# Patient Record
Sex: MALE | Race: WHITE
[De-identification: names, ages, dates, MRNs, and addresses within clinical notes are randomized per-mention and may not be internally consistent; named-entity substitution may affect disease eponyms.]

---

## 2019-07-23 ENCOUNTER — HOSPITAL ENCOUNTER (INPATIENT)
Dept: HOSPITAL 25 - ED | Age: 79
LOS: 2 days | Discharge: ANOTHER HOSPITAL | DRG: 282 | End: 2019-07-25
Attending: HOSPITALIST | Admitting: INTERNAL MEDICINE
Payer: MEDICARE

## 2019-07-23 DIAGNOSIS — I10: ICD-10-CM

## 2019-07-23 DIAGNOSIS — K21.9: ICD-10-CM

## 2019-07-23 DIAGNOSIS — Z87.891: ICD-10-CM

## 2019-07-23 DIAGNOSIS — E78.5: ICD-10-CM

## 2019-07-23 DIAGNOSIS — Z82.49: ICD-10-CM

## 2019-07-23 DIAGNOSIS — Z85.038: ICD-10-CM

## 2019-07-23 DIAGNOSIS — Z80.9: ICD-10-CM

## 2019-07-23 DIAGNOSIS — I21.4: Primary | ICD-10-CM

## 2019-07-23 DIAGNOSIS — Z79.84: ICD-10-CM

## 2019-07-23 DIAGNOSIS — E11.9: ICD-10-CM

## 2019-07-23 DIAGNOSIS — J45.909: ICD-10-CM

## 2019-07-23 DIAGNOSIS — Z79.51: ICD-10-CM

## 2019-07-23 DIAGNOSIS — Z79.899: ICD-10-CM

## 2019-07-23 DIAGNOSIS — I25.110: ICD-10-CM

## 2019-07-23 LAB
ALBUMIN SERPL BCG-MCNC: 4.2 G/DL (ref 3.2–5.2)
ALBUMIN/GLOB SERPL: 1.5 {RATIO} (ref 1–3)
ALP SERPL-CCNC: 100 U/L (ref 34–104)
ALT SERPL W P-5'-P-CCNC: 28 U/L (ref 7–52)
ANION GAP SERPL CALC-SCNC: 7 MMOL/L (ref 2–11)
APTT PPP: 34.5 SECONDS (ref 26–38)
AST SERPL-CCNC: 20 U/L (ref 13–39)
BASOPHILS # BLD AUTO: 0.1 10^3/UL (ref 0–0.2)
BUN SERPL-MCNC: 20 MG/DL (ref 6–24)
BUN/CREAT SERPL: 19.2 (ref 8–20)
CALCIUM SERPL-MCNC: 9.8 MG/DL (ref 8.6–10.3)
CHLORIDE SERPL-SCNC: 106 MMOL/L (ref 101–111)
EOSINOPHIL # BLD AUTO: 0 10^3/UL (ref 0–0.6)
GLOBULIN SER CALC-MCNC: 2.8 G/DL (ref 2–4)
GLUCOSE SERPL-MCNC: 199 MG/DL (ref 70–100)
HCO3 SERPL-SCNC: 26 MMOL/L (ref 22–32)
HCT VFR BLD AUTO: 44 % (ref 42–52)
HGB BLD-MCNC: 14.8 G/DL (ref 14–18)
INR PPP/BLD: 1.04 (ref 0.82–1.09)
LYMPHOCYTES # BLD AUTO: 0.8 10^3/UL (ref 1–4.8)
MCH RBC QN AUTO: 29 PG (ref 27–31)
MCHC RBC AUTO-ENTMCNC: 33 G/DL (ref 31–36)
MCV RBC AUTO: 86 FL (ref 80–94)
MONOCYTES # BLD AUTO: 0.3 10^3/UL (ref 0–0.8)
NEUTROPHILS # BLD AUTO: 9.4 10^3/UL (ref 1.5–7.7)
NRBC # BLD AUTO: 0 10^3/UL
NRBC BLD QL AUTO: 0
PLATELET # BLD AUTO: 283 10^3/UL (ref 150–450)
POTASSIUM SERPL-SCNC: 4 MMOL/L (ref 3.5–5)
PROT SERPL-MCNC: 7 G/DL (ref 6.4–8.9)
RBC # BLD AUTO: 5.16 10^6 /UL (ref 4.18–5.48)
SODIUM SERPL-SCNC: 139 MMOL/L (ref 135–145)
TROPONIN I SERPL-MCNC: 0.22 NG/ML (ref ?–0.04)
WBC # BLD AUTO: 10.6 10^3/UL (ref 3.5–10.8)

## 2019-07-23 PROCEDURE — 36415 COLL VENOUS BLD VENIPUNCTURE: CPT

## 2019-07-23 PROCEDURE — 93306 TTE W/DOPPLER COMPLETE: CPT

## 2019-07-23 PROCEDURE — 85025 COMPLETE CBC W/AUTO DIFF WBC: CPT

## 2019-07-23 PROCEDURE — 85610 PROTHROMBIN TIME: CPT

## 2019-07-23 PROCEDURE — 93005 ELECTROCARDIOGRAM TRACING: CPT

## 2019-07-23 PROCEDURE — C1769 GUIDE WIRE: HCPCS

## 2019-07-23 PROCEDURE — 99284 EMERGENCY DEPT VISIT MOD MDM: CPT

## 2019-07-23 PROCEDURE — 85730 THROMBOPLASTIN TIME PARTIAL: CPT

## 2019-07-23 PROCEDURE — 84484 ASSAY OF TROPONIN QUANT: CPT

## 2019-07-23 PROCEDURE — C8929 TTE W OR WO FOL WCON,DOPPLER: HCPCS

## 2019-07-23 PROCEDURE — 80048 BASIC METABOLIC PNL TOTAL CA: CPT

## 2019-07-23 PROCEDURE — 80061 LIPID PANEL: CPT

## 2019-07-23 PROCEDURE — 86901 BLOOD TYPING SEROLOGIC RH(D): CPT

## 2019-07-23 PROCEDURE — 80053 COMPREHEN METABOLIC PANEL: CPT

## 2019-07-23 PROCEDURE — 86850 RBC ANTIBODY SCREEN: CPT

## 2019-07-23 PROCEDURE — 93454 CORONARY ARTERY ANGIO S&I: CPT

## 2019-07-23 PROCEDURE — 71045 X-RAY EXAM CHEST 1 VIEW: CPT

## 2019-07-23 PROCEDURE — 86900 BLOOD TYPING SEROLOGIC ABO: CPT

## 2019-07-24 LAB
CHOLEST SERPL-MCNC: 176 MG/DL
HDLC SERPL-MCNC: 44 MG/DL
TRIGL SERPL-MCNC: 94 MG/DL
TROPONIN I SERPL-MCNC: 1.06 NG/ML (ref ?–0.04)
TROPONIN I SERPL-MCNC: 1.26 NG/ML (ref ?–0.04)
TROPONIN I SERPL-MCNC: 2.52 NG/ML (ref ?–0.04)
TROPONIN I SERPL-MCNC: 2.65 NG/ML (ref ?–0.04)

## 2019-07-24 PROCEDURE — B211YZZ FLUOROSCOPY OF MULTIPLE CORONARY ARTERIES USING OTHER CONTRAST: ICD-10-PCS | Performed by: INTERNAL MEDICINE

## 2019-07-24 PROCEDURE — 4A023N7 MEASUREMENT OF CARDIAC SAMPLING AND PRESSURE, LEFT HEART, PERCUTANEOUS APPROACH: ICD-10-PCS | Performed by: INTERNAL MEDICINE

## 2019-07-24 RX ADMIN — ACETAMINOPHEN PRN MG: 325 TABLET ORAL at 21:16

## 2019-07-24 RX ADMIN — FAMOTIDINE SCH MG: 10 INJECTION, SOLUTION INTRAVENOUS at 21:17

## 2019-07-24 RX ADMIN — INSULIN LISPRO SCH: 100 INJECTION, SOLUTION INTRAVENOUS; SUBCUTANEOUS at 05:45

## 2019-07-24 RX ADMIN — INSULIN LISPRO SCH: 100 INJECTION, SOLUTION INTRAVENOUS; SUBCUTANEOUS at 16:14

## 2019-07-24 RX ADMIN — FAMOTIDINE SCH MG: 10 INJECTION, SOLUTION INTRAVENOUS at 09:13

## 2019-07-24 RX ADMIN — METOPROLOL TARTRATE SCH MG: 25 TABLET, FILM COATED ORAL at 19:33

## 2019-07-24 RX ADMIN — INSULIN LISPRO SCH: 100 INJECTION, SOLUTION INTRAVENOUS; SUBCUTANEOUS at 12:13

## 2019-07-24 RX ADMIN — ACETAMINOPHEN PRN MG: 325 TABLET ORAL at 02:38

## 2019-07-24 NOTE — CATH
CC:  Kelton Cevallos; Dr. Marvin James

 

CATHETERIZATION REPORT:

 

DATE OF PROCEDURE:  07/24/19

 

PRIMARY CARE PHYSICIAN:  Dr. Calles at [place].

 

PROCEDURES:  Right radial artery access, bilateral selective coronary cineangiography.

 

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
ing threshold for angina.  The day prior to admission, he had prolonged 2 to 3 hours of chest pain at
 rest.  Subsequent troponin positive to 2.5. Echocardiogram showed distal anterior septal and apical 
akinesis, LVEF 45% to 50%. Mild aortic stenosis.

 

LOUIS score 3 to 4, AUC appropriate.

 

PROCEDURE ACCESS:  Right radial artery sheath 6F [place].

 

DIAGNOSTIC CATHETERS:  5F TIG4, 5FR 4, 5FL 3.5.  There was subclavian tortuosity easily passed with a
 Wholey wire.  I was not able to easily cross the aortic valve with a pigtail with a J or straight-ti
p floppy wire.  LV pressure and LV gram were therefore not performed.

 

MEDICATIONS:

1.  Subcu lidocaine.

2.  IV Versed.

3.  IV fentanyl.

4.  Heparin 3000 units.

5.  Verapamil 3 mg.

6.  Nitroglycerin 300 mcg IA.

 

HEMODYNAMICS:  Initial /64, final /73.

 

ANGIOGRAPHY: Left main:  The left main is normal in size and length, has minimal distal taper without
 significant stenosis.

 

LAD:  The LAD is large, has proximal mild luminal irregularity, the proximal LAD then supplies a larg
e caliber first diagonal which has ostial 60% stenosis, the LAD at the diagonal origin has a 90% sten
osis with haziness consistent with thrombus. The LAD is large, continues and supplies to distal infer
ior septum, has no distal stenosis.  The third diagonal is very small, has a proximal 80% stenosis, i
t is too small for revascularization.

 

Circumflex:  The circumflex is large, not dominant, supplies a moderate first marginal which has an e
ccentric 50% to 60% stenosis, then bifurcates.  The circumflex then supplies a large posterolateral, 
a more distal smaller posterolateral branch is occluded at the origin without a clearly identifiable 
entry point, fills by left-to-left collaterals, reference diameter approximately 1.5 to 2 mm.

 

RCA:  The RCA is moderate, dominant, with a proximal 50% to 60% stenosis, distally supplies a small t
o moderate PDA and posterolateral.

 

CONCLUSION:

1.  Three-vessel disease with culprit LAD diagonal lesion with likely plaque rupture and thrombus inv
olving the origin of a large diagonal branch.  His RCA and OM stenoses are intermediate, his circumfl
ex posterolateral distal branch is occluded and small.  We will discuss with him culprit lesion LAD d
iagonal revascularization versus bypass grafting with pros and cons.

2.  Successful right radial artery access.

 

 [id number]/[facility] #: [id number]

## 2019-07-24 NOTE — ECHO
*[facility]*

[place] [address]

Phone: [phone]

Fax #: [phone]



-------------------------------------------------------------------

Transthoracic Echocardiogram



Patient: James Duran                MRN:        [id number]

:     1940                         Study Date: 2019

Age:     79                                 Accession#: [id number]

Gender:  M                                  HR:

Height:  71 in /180.3 cm                    BSA:        2.11 m^2

Weight:  199.6 lb /90.7 kg                  BMI:        27.9 kg/m^2



*Sonographer: Odilia Pulido [facility]

 

*Referring Physician: * Kelly Chandler

*Reading Physician: * Chyna Langford MD



-------------------------------------------------------------------

Indications:   Chest Pain, unspecified.



-------------------------------------------------------------------

History:   Chronic obstructive pulmonary disease.  Risk factors:

Hypertension. Diabetes mellitus.



-------------------------------------------------------------------

Conclusions



Summary:



- Left ventricle: The cavity size is mildly reduced. Wall thickness

  is mildly increased. Systolic function is at the lower limits of

  normal. The estimated ejection fraction is 50%. Hypokinesis of

  the apicalanteroseptal myocardium.

- Right ventricle: Systolic function is normal.

- Aortic valve: The findings are consistent with mild stenosis. The

  mean systolic gradient is 8.0 mm Hg. The valve area by the

  velocity-time integral method is 1.60 cm^2. The ratio of LVOT to

  aortic valve peak velocity is 0.5. The valve area by the peak

  velocity method is 1.60 cm^2.

- No prior echocardiogram to compare.



-------------------------------------------------------------------

Study data:  Transthoracic echocardiogram.  Procedure:

Transthoracic echocardiography was performed. Image quality was

poor. Intravenous Definity , 3 mlswas administered.  Complete 2D,

spectral Doppler, and color flow Doppler.  Location:  Bedside.

Patient status:  Inpatient. Patient room number: 442 01.  Rhythm:

Normal sinus rhythm.



-------------------------------------------------------------------

Findings



Left ventricle:  The cavity size is mildly reduced. Wall thickness

is mildly increased. Systolic function is at the lower limits of

normal. The estimated ejection fraction is 50%.  Regional wall

motion abnormalities:   Hypokinesis of the apicalanteroseptal

myocardium. Doppler parameters are consistent with abnormal left

ventricular relaxation (grade 1 diastolic dysfunction).

Right ventricle:  The cavity size is normal. Systolic function is

normal.

Left atrium:  The atrium is normal in size.

Right atrium:  Not well visualized.

Mitral valve:  Is mildly calcified. The leaflets are normal

thickness.  There is no evidence of stenosis.   There is trace

regurgitation.

Aortic valve:  The annulus is mildly calcified. The valve is

trileaflet. The leaflets are mildly thickened.  The findings are

consistent with mild stenosis.   There is no significant

regurgitation.

Tricuspid valve:   The valve is structurally normal.    There is no

evidence of stenosis.   There is trace regurgitation.

Pulmonic valve:   The leaflets are normal thickness.  There is no

evidence of stenosis.   There is trace regurgitation.

Aorta:  The aortic root appears normal. The aortic arch appears

normal.

Pericardium:  There is no significant pericardial effusion.

Pulmonary arteries:

Not well visualized.  Systolic pressure can not be accurately

estimated.

Systemic veins:

Inferior vena cava: Not well visualized.



-------------------------------------------------------------------

Measurements



 Left ventricle            Value        Ref         Aortic valve continued       Value       Ref

 JANIYA, LAX          (L)     3.3   cm     4.2 - 5.8   Peak v, S                    1.8   m/sec ----

 ESD, LAX                  2.5   cm     2.5 - 4.0   VTI, S                       44.0  cm    ----

 FS, LAX                   26    %      25 - 43     Mean grad, S                 8.0   mm Hg ----

 PW, ED, LAX               1.0   cm     0.6 - 1.0   Peak grad, S                 13.0  mm Hg ----

 EF                        52    %      52 - 72     LVOT/AV, VTI ratio           0.52        ----

 E&apos;, lat aakash, TDI  (L)     5.5   cm/sec &gt;=10.0      FREDI, VTI                     1.60  cm^2  
----

 E/e&apos;, lat aakash,            15           ----------  FREDI, Vmax                    1.60  cm^2  ---
-

 TDI

 E&apos;, med aakash, TDI  (L)     6.0   cm/sec &gt;=7.0       Mitral valve                 Value       
Ref

 E/e&apos;, med aakash,            14           ----------  Peak E                       0.82  m/sec ---
-

 TDI                                                Peak A                       1     m/sec ----

 E&apos;, avg, TDI              5.8   cm/sec ----------  Decel time                   202   ms    ---
-

 E/e&apos;, avg, TDI            14           &lt;=14        PHT                          97    ms    
----

                                                    Mean grad, D                 2.0   mm Hg ----

 LVOT                      Value        Ref         Peak grad, D                 4.0   mm Hg ----

 Diam, S                   2.00  cm     ----------  Peak E/A ratio               0.8         ----

 Area                      3.1   cm^2   ----------  MVA, PHT                     2.3   cm^2  ----

 Peak abram, S               0.9   m/sec  ----------

 VTI, S                    23.0  cm     ----------  Pulmonic valve               Value       Ref

 Mean grad, S              2     mm Hg  ----------  Peak v, S                    0.54  m/sec ----

 SV                        71    ml     ----------  Peak grad, S                 1.0   mm Hg ----

 

 Ventricular septum        Value        Ref         Aortic root                  Value       Ref

 IVS, ED           (H)     1.2   cm     0.6 - 1.0   Root diam                    3.1   cm    &lt;4.2

 

 Right ventricle           Value        Ref         Ascending aorta              Value       Ref

 JANIYA, LAX                  3.0   cm     ----------  AAo AP diam, S               2.9   cm    ----

 

 Left atrium               Value        Ref         Aortic arch                  Value       Ref

 AP dim, ES                4.00  cm     3.00 -      Arch diam                    3.1   cm    ----

                                        4.00

                                                    Decending aorta              Value       Ref

 Right atrium              Value        Ref         Torrey peak abram                 0.84  m/sec ----

 Estimated RAP             8     mm Hg  ----------

 

 Aortic valve              Value        Ref

 Aakash diam, ED              2.0   cm     ----------

 

Legend:

(L)  and  (H)  jason values outside specified reference range.



Prepared and electronically signed by



Chyna Langford MD

2019 13:38

## 2019-07-24 NOTE — CONS
CC:  Dr. Calles *

 

CONSULTATION REPORT:

 

DATE OF CONSULT:  07/24/19

 

ATTENDING PHYSICIAN:  Dr. James, Cardiology.* (DICTATED BY CHELE CROWELL NP)

 

PRIMARY PHYSICIAN:  Dr. Calles.

 

PRIMARY CARDIOLOGIST:  None.

 

CHIEF COMPLAINT:  Chest pain.

 

HISTORY OF PRESENT ILLNESS:  This is a pleasant 79-year-old male patient with a 
notable history of sigmoid adenocarcinoma, treated with ileostomy, with 
reversal in 2014; hypertension; asthma; GERD; and type 2 diabetes mellitus.  
The patient states that he has had a 3- to 4-year history of exertional 
substernal chest pain described as heavy like in nature, occurring with 
activities such as splitting wood, going upstairs, and walking on an incline.  
He adds that for the past 8 to 12 months symptoms have been progressive in 
regards to frequency and are now occurring with activities that he historically 
would be able to tolerate.  Apparently, he lost his wife 2 months ago due to 
complications from colon cancer.  He adds that on Sunday while walking with his 
son to a local construction site that is a quarter- mile in length, he 
developed chest pain with incline and it resolved within 5 minutes once he 
stopped activity.  Yesterday around 7:30 p.m., he drove to the same 
construction site, while walking on a flat gradient he started to develop 
substernal chest pain rated 2/10.  The patient states the pain progressively 
got worse on his way back going up incline.  It persisted until he drove to his 
home and sat in a seated position.  Total time in the episode was roughly 30 
minutes. He states after pain resolved he got up out of his chair and walked 
outside to go to the bathroom when he developed the second episode of chest 
pain.  He states that he was concerned because the episode occurred with 
minimal activity such as walking to the door.  He called his sister who 
prompted him to call 911.  En route to [facility], he was given 
sublingual nitroglycerin by EMS that resolved the pain.  He has not had 
recurrent symptoms since then.  He denies associated symptoms such as 
palpitations, sensation of heart racing, dizziness, syncope, diaphoresis and 
states pain radiated to the center portion of his back.

 

PAST MEDICAL HISTORY:

1.  Asthma.

2.  GERD.

3.  Hypertension.

4.  Hyperlipidemia.

5.  Sigmoid cancer, in remission.

6.  Type 2 diabetes mellitus.

 

PAST SURGICAL HISTORY:

1.  The patient had colostomy, ileostomy with reversal in 2014.

2.  Tonsillectomy.

 

HOME MEDICATIONS:  Per admission med rec.

 

ALLERGIES:  No known drug allergies.  Denies allergies to SHELLFISH, CONTRAST 
DYE, or RED DYE.

 

FAMILY HISTORY:  Positive for cardiovascular disease involving first-degree 
relative at age of 61.

 

SOCIAL HISTORY:  The patient is a former tobacco user.  He states he smoked a 
pipe and cigar for 25 years and quit in 1984.  Drinks beer on occasion.  Denies 
illegal drug use.  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  He is recently .  In regards to 
activity, he remains active, cutting wood and gardening.

 

PHYSICAL EXAM:  Temperature 97.8, pulse 80, respirations 14, blood pressure 140/
93. General:  The patient is lying in bed upon entering the room, cooperative 
with examination, alert and oriented x3, in no apparent distress.  HEENT:  Head 
is atraumatic, normocephalic.  Oral mucosa is moist.  Tongue is midline.  Neck 
is supple.  Trachea midline.  No JVD.  No carotid bruits.  Cardiac:  Normal S1, 
S2. Regular rate and rhythm.  There is a grade 2/6 early systolic murmur 
auscultated at the right sternal border radiating into left sternal border.  
There is a grade 1/5 diastolic mitral murmur auscultated at the left axilla.  
No gallop or rub.  Lungs: Auscultated posteriorly, slight inspiratory rales 
noted in the left lower base, otherwise no other adventitious breath sounds are 
auscultated.  /GI:  Abdomen is soft, nontender, nondistended.  Normal bowels 
sounds x4.  No hepatomegaly to palpation.  Extremities:  Examined, the patient 
has strong right femoral pulse palpated.  The patient has evidence of venous 
insufficiency.  The patient has 2+ dorsalis pedis pulse palpated bilaterally 
and symmetrically.

 

DIAGNOSTIC STUDIES/LAB DATA:  Blood work reviewed.  White count 10.6, 
hemoglobin 14.8, hematocrit 44, platelets 293.  Sodium 139, potassium 4, 
chloride 106, carbon dioxide 26, BUN 21, creatinine 1.04, glucose 199.  
Troponin #1, 0.22; troponin #2, 1.06; troponin #3, 2.52.  .

 

ECG was reviewed, demonstrates normal sinus rhythm, rate 63 with nonspecific T-
wave flattening in lead III, aVF and II.

 

Chest x-ray from 07/23/19; per radiology report, no evidence for acute 
cardiopulmonary process.

 

ASSESSMENT AND PLAN:

1.  Non-ST-elevation myocardial infarction; the patient has had crescendo 
angina occurring over the past 8 to 12 months with increased frequency, now 
occurring with activities that he historically was able to tolerate.  Last 
episode was yesterday evening, resolved with administration of sublingual 
nitroglycerin.  The patient has nonspecific T-wave flattening in inferior 
leads.  Troponin continues to trend up, last isoenzyme was 2.52.  The patient 
received subcutaneous Lovenox per ACS protocol, last dose is at midnight last 
night.  The patient would benefit from cardiac catheterization.  Procedure was 
reviewed the patient including risks and benefits not limited to bleeding, 
infection, vessel damage, risk of contrast induced nephropathy, possibility of 
stroke, heart attack, death, requirement for dual antiplatelet therapy or 
referral for bypass surgery.  The patient denies any history of bleeding 
complications in the past.  Platelets are stable.  He is agreeable to 
proceeding with left heart catheterization.  The consent should be obtained by 
interventional cardiologist, Dr. Marvin James.  I would recommend updating 
echocardiogram given systolic aortic murmur on examination.  We will reduce 
aspirin to 81 mg a day, initiate Lopressor 12.5 mg a day, start Lipitor 80 mg 
p.o. q.h.s., and make further recommendations post cardiac catheterization.

2.  Hyperlipidemia.  LDL was 115.  Given ______ coronary artery disease, 
recommend LDL less than 70.  We will start Lipitor 80 mg p.o. q.h.s.  We will 
need repeat fasting lipid and liver function tests in 6 to 8 weeks' time.

3.  History of hypertension.  Norvasc is on hold. We will initiate Lopressor 
12.5 mg p.o. b.i.d. and titrate as needed.

4. Systolic aortic valve murmur and soft diastolic mitral valve murmur.  We 
will obtain an echocardiogram and follow.

5.  Disposition.  Pending course.  The patient is full code. Dr. James has 
personally seen and examined the patient, agrees with the above assessment and 
plan.

 

____________________________________ CHELE CROWELL, NP

 

[id number]/[facility] #: [id number]

PRASHANT

## 2019-07-24 NOTE — ED
HPI Chest Pain





- HPI Summary


HPI Summary: 


The pt is a 79 yr old male presenting to [facility] via EMS c/o chest pain beginning 

3 hours PTA. He was walking when the chest pain started and reports diaphoresis 

as well. SOB and nausea are denied. The pain was bilateral across his chest and 

radiated through to the back. He states that the pain subsided when he was at 

rest and increased when he began walking again. He is currently in pain and 

describes his chest as feeling bruised, rating its severity a 3/10. He 

mentions that he had a similar episode of CP several years ago. He has not had 

a stress test before. The pt takes blood pressure medication and aspirin at home

, and was administered 324 mg ASA and 3 nitro in the EMS. He has Hx of HTN, COPD

, and Asthma.








- History of Current Complaint


Chief Complaint: EDChestPainROMI


Time Seen by Provider: 07/23/19 22:26


Hx Obtained From: Patient


Onset/Duration: Started Hours Ago, Still Present


Time of Onset: 19:00


Timing: Intermittent


Initial Severity: Mild


Current Severity: Mild


Pain Intensity: 3


Pain Scale Used: 0-10 Numeric


Chest Pain Location: Diffuse


Chest Pain Radiates: Yes


Chest Pain Radiates To:: Back


Character: Other: - "Bruised"


Aggravating Factor(s): Exertion


Alleviating Factor(s): Rest


Associated Signs and Symptoms: Positive: Chest Pain, Diaphoresis.  Negative: 

Shortness of Breath, Nausea





- Allergy/Home Medications


Allergies/Adverse Reactions: 


 Allergies











Allergy/AdvReac Type Severity Reaction Status Date / Time


 


No Known Drug Allergies Allergy  See Comment Verified 07/24/19 00:39


 


ENVIRONMENTAL Allergy Intermediate See Comment Uncoded 06/12/17 08:36











Home Medications: 


 Home Medications





Chlorhexidine MW 0.12% 473ML* [Peridex Mouth Wash 0.12%] 1 dose PO DAILY 07/23/ 19 [History Confirmed 07/23/19]


Metformin ER (NF) [Glucophage  MG TAB (NF)] 750 mg PO 1700 07/23/19 [

History Confirmed 07/23/19]


[place]-3S/Dha/Epa/Fish Oil [Fish Oil 1,200 mg Softgel] 1 cap PO BID 07/23/19 [

History Confirmed 07/23/19]


Polyethylene Glycol 3350* [Miralax*] 17 gm PO DAILY 07/23/19 [History Confirmed 

07/23/19]











PMH/Surg Hx/FS Hx/Imm Hx


Endocrine/Hematology History: Reports: Hx Diabetes, Hx Anemia - CURRENTLY


Cardiovascular History: Reports: Hx Hypertension - on meds, Other 

Cardiovascular Problems/Disorders - htn


   Denies: Hx Peripheral Vascular Disease


Respiratory History: Reports: Hx Asthma - enviornmental, Hx Chronic Obstructive 

Pulmonary Disease (COPD)


   Denies: Other Respiratory Problems/Disorders


GI History: Reports: Hx Gastroesophageal Reflux Disease, Other GI Disorders - 

colon cancer


 History: 


   Denies: Hx Renal Disease


Musculoskeletal History: Reports: Hx Arthritis - KNEES, Hx Back Problems, Hx 

Tendonitis - JUSTINO SHOULDERS, Other Musculoskeletal History - OSTOEARTHORITI ON 

FINGERS


Sensory History: Reports: Hx Contacts or Glasses - READING


   Denies: Hx Hearing Aid


Opthamlomology History: Reports: Hx Contacts or Glasses - READING


Neurological History: 


   Denies: Other Neuro Impairments/Disorders





- Cancer History


Cancer Type, Location and Year: colon CA


Hx Chemotherapy: No





- Surgical History


Surgery Procedure, Year, and Place: tonsils-1957; 3 abdominal surgeries


Hx Anesthesia Reactions: No


Infectious Disease History: No


Infectious Disease History: 


   Denies: Hx Clostridium Difficile, Hx Hepatitis, Hx Human Immunodeficiency 

Virus (HIV), Hx of Known/Suspected MRSA, Hx Shingles, Hx Tuberculosis, Hx Known/

Suspected VRE, Hx Known/Suspected VRSA, History Other Infectious Disease, 

Traveled Outside the [place] in Last 30 Days





- Family History


Known Family History: 


   Negative: Blood Disorder





- Social History


Alcohol Use: Occasionally


Alcohol Amount: 1 PER WEEK


Substance Use Type: Reports: None


Smoking Status (MU): Former Smoker


Amount Used/How Often: PIPE 2 OZ PER DAY


Length of Time of Smoking/Using Tobacco: 1984


Have You Smoked in the Last Year: No





Review of Systems


Positive: Skin Diaphoresis


Positive: Chest Pain


Negative: Shortness Of Breath


Negative: Nausea


All Other Systems Reviewed And Are Negative: Yes





Physical Exam





- Summary


Physical Exam Summary: 


VITAL SIGNS: Reviewed.


GENERAL:  Patient is a well-developed and nourished male who is lying 

comfortable in the stretcher. Patient is not in any acute respiratory distress.


HEAD AND FACE: No signs of trauma. No ecchymosis, hematomas or skull 

depressions. No sinus tenderness.


EYES: PERRLA, EOMI x 2, No injected conjunctiva, no nystagmus.


EARS: Hearing grossly intact. Ear canals and tympanic membranes are within 

normal limits.


MOUTH: Oropharynx within normal limits.


NECK: Supple, trachea is midline, no adenopathy, no JVD, no carotid bruit, no c-

spine tenderness, neck with full ROM


CHEST: Symmetric, no tenderness at palpation


LUNGS: Clear to auscultation bilaterally. No wheezing or crackles.


CVS: Regular rate and rhythm, S1 and S2 present, no murmurs or gallops 

appreciated.


ABDOMEN: Soft, non-tender. No signs of distention. No rebound no guarding, and 

no masses palpated. Bowel sounds are normal.


EXTREMITIES: FROM in all major joints, no edema, no cyanosis or clubbing.


NEURO: Alert and oriented x 3. No acute neurological deficits. Speech is normal 

and follows commands.


SKIN: Dry and warm





Triage Information Reviewed: Yes


Vital Signs On Initial Exam: 


 Initial Vitals











Temp Pulse Resp BP Pulse Ox


 


 98 F   83   18   160/85   94 


 


 07/23/19 22:16  07/23/19 22:16  07/23/19 22:16  07/23/19 22:16  07/23/19 22:16











Vital Signs Reviewed: Yes





Diagnostics





- Vital Signs


 Vital Signs











  Temp Pulse Resp BP Pulse Ox


 


 07/24/19 00:32   78  16  149/87  96


 


 07/24/19 00:27   78  18  163/86  94


 


 07/24/19 00:22   77  19  169/94  96


 


 07/24/19 00:20   82  18  161/87  94


 


 07/24/19 00:01   80  20   97


 


 07/23/19 23:50   77  18  152/83  97


 


 07/23/19 23:42   81  19   97


 


 07/23/19 23:20   72  13  143/73  97


 


 07/23/19 23:00   74  14   98


 


 07/23/19 22:50   78  16  137/78  96


 


 07/23/19 22:20   75  18  160/85  96


 


 07/23/19 22:19   90  17   95


 


 07/23/19 22:16  98 F  83  18  160/85  94














- Laboratory


Lab Results: 


 Lab Results











  07/23/19 07/23/19 07/23/19 Range/Units





  23:07 23:07 23:07 


 


WBC  10.6    (3.5-10.8)  10^3/uL


 


RBC  5.16    (4.18-5.48)  10^6 /uL


 


Hgb  14.8    (14.0-18.0)  g/dL


 


Hct  44    (42-52)  %


 


MCV  86    (80-94)  fL


 


MCH  29    (27-31)  pg


 


MCHC  33    (31-36)  g/dL


 


RDW  14    (10-15)  %


 


Plt Count  283    (150-450)  10^3/uL


 


MPV  6.9 L    (7.4-10.4)  fL


 


Neut % (Auto)  88.3    %


 


Lymph % (Auto)  7.8    %


 


Mono % (Auto)  3.0    %


 


Eos % (Auto)  0.3    %


 


Baso % (Auto)  0.6    %


 


Absolute Neuts (auto)  9.4 H    (1.5-7.7)  10^3/ul


 


Absolute Lymphs (auto)  0.8 L    (1.0-4.8)  10^3/ul


 


Absolute Monos (auto)  0.3    (0-0.8)  10^3/ul


 


Absolute Eos (auto)  0.0    (0-0.6)  10^3/ul


 


Absolute Basos (auto)  0.1    (0-0.2)  10^3/ul


 


Absolute Nucleated RBC  0.0    10^3/ul


 


Nucleated RBC %  0.0    


 


INR (Anticoag Therapy)    1.04  (0.82-1.09)  


 


APTT    34.5  (26.0-38.0)  seconds


 


Sodium   139   (135-145)  mmol/L


 


Potassium   4.0   (3.5-5.0)  mmol/L


 


Chloride   106   (101-111)  mmol/L


 


Carbon Dioxide   26   (22-32)  mmol/L


 


Anion Gap   7   (2-11)  mmol/L


 


BUN   20   (6-24)  mg/dL


 


Creatinine   1.04   (0.67-1.17)  mg/dL


 


Est GFR ( Amer)   83.6   (>60)  


 


Est GFR (Non-Af Amer)   69.1   (>60)  


 


BUN/Creatinine Ratio   19.2   (8-20)  


 


Glucose   199 H   ()  mg/dL


 


Calcium   9.8   (8.6-10.3)  mg/dL


 


Total Bilirubin   0.50   (0.2-1.0)  mg/dL


 


AST   20   (13-39)  U/L


 


ALT   28   (7-52)  U/L


 


Alkaline Phosphatase   100   ()  U/L


 


Troponin I   0.22 H*   (<0.04)  ng/mL


 


Total Protein   7.0   (6.4-8.9)  g/dL


 


Albumin   4.2   (3.2-5.2)  g/dL


 


Globulin   2.8   (2-4)  g/dL


 


Albumin/Globulin Ratio   1.5   (1-3)  











Result Diagrams: 


 07/23/19 23:07





 07/23/19 23:07


Lab Statement: Any lab studies that have been ordered have been reviewed, and 

results considered in the medical decision making process.





- Radiology


  ** CXR


Radiology Interpretation Completed By: ED Physician


Summary of Radiographic Findings: No acute processes, pending official report.





- EKG


  ** 0000


Cardiac Rate: NL - 76 BPM


EKG Rhythm: Sinus Rhythm


Summary of EKG Findings: Normal axis. Normal interval. No ischemic changes.





  ** 0025


Cardiac Rate: NL - 74 BPM


EKG Rhythm: Sinus Rhythm


EKG Comparison: No Significant Change - from EKG at 0000


Summary of EKG Findings: Normal axis. Normal interval. No ischemic changes.





Re-Evaluation





- Re-Evaluation


  ** First Eval


Re-Evaluation Time: 00:25


Change: Unchanged


Comment: Pt was feeling chest pain again. The pt was given morphine, lovenox, 

and moteprolol.





Chest Pain Course/Dx





- Course


Course Of Treatment: The pt is a 79 yr old male presenting to [facility] via EMS c/o 

chest pain beginning 3 hours PTA. He was walking when the chest pain started 

and reports diaphoresis as well. SOB and nausea are denied. The pain was 

bilateral across his chest and radiated through to the back. He states that the 

pain subsided when he was at rest and increased when he began walking again. He 

is currently in pain and describes his chest as feeling bruised, rating its 

severity a 3/10. He mentions that he had a similar episode of CP several years 

ago. The pt takes blood pressure medication and aspirin at home, and was 

administered 324 mg ASA and 3 nitro in the EMS. He has Hx of HTN, COPD, and 

Asthma. An EKG taken @ 0000 reveals sinus rhythm, normal rate @ 76 bpm, normal 

axis, normal interval, no ischemic changes. A repeat EKG taken @ 0025 reveals  

sinus rhythm, normal rate @ 74 bpm, normal axis, normal interval, no ischemic 

changes, and no significant changes from the previous EKG @ 0000. A CXR reveals 

no acute processes. Test results with no significant abnormalities except for 

MPV @ 6.9, Absolute Neuts @ 9.4, Absolute Lymphs @ 0.8, Glucose @ 199, and 

Troponin I @ 0.22.  In the ED course the pt was given 90 mg Lovenox SUBCUT, 5 

mg Lopressor IV, 25 mg Lopressor PO, 4 mg Morphine IV, 0.5 inch NTG TOPICAL, 

and 8 mg Zofran IV. Dr. Chandler was consulted @ 0228. Dr. Chandler will admit the pt 

to [facility] [place].





- Diagnoses


Provider Diagnoses: 


 Acute coronary syndrome








- Provider Notifications


Discussed Care Of Patient With: Kelly Chandler


Time Discussed With Above Provider: 23:50


Instructed by Provider To: Admit As Inpatient





Discharge





- Sign-Out/Discharge


Documenting (check all that apply): Patient Departure - admit


Patient Received Moderate/Deep Sedation with Procedure: No





- Discharge Plan


Condition: Stable


Disposition: ADMITTED TO [facility]





- Attestation Statements


Document Initiated by Scribe: Yes


Documenting Scribe: JEFF HUMPHREY


Provider For Whom Scribe is Documenting (Include Credential): SHANNAN RAMSEY MD


Scribe Attestation: 


IJEFF, scribed for SHANNAN RAMSEY MD on 07/24/19 at 0206. 


Status of Scribe Document: Ready

## 2019-07-24 NOTE — HP
CC:  Dr. Rusty Calles; Dr. Copeland *

 

HISTORY AND PHYSICAL:

 

DATE OF ADMISSION:  19

 

PRIMARY CARE PROVIDER:  Dr. Rusty Calles.

 

ONCOLOGY:  Dr. Copeland.

 

CHIEF COMPLAINT:  Chest pain.

 

HISTORY OF PRESENT ILLNESS:  James Duran is a 79-year-old male with 
history of colon cancer, status post resection in  and currently in 
remission, who presented to the emergency department with concerns of chest 
pain.  The patient stated that he has been having mild chest pains with 
exercise for the past 3 to 4 years.  He stated that he was not very concerned 
about them since they did not happen "all the time with exercise."  Today, he 
was walking down the hill to see a construction work side close to his home and 
started having mild chest pain with walking.  On his way back when he had to 
walk up the hill, the chest pain was more severe.  When he stopped, the pain 
would go away and then started again. Eventually, he got home and the pain went 
away for about 15 minutes but then it restarted again when he got up to call 
his sister.  At that point, it was 9/10 in intensity, he broke out in a  sweat 
and he was nauseated.  He denied any shortness of breath.  He was given 
nitroglycerin in the ambulance and the pain basically went away.  He currently 
has discomfort substernally approximately 1 to 2.  The pain is not pleuritic.  
It is localized substernally, radiating to bilateral lower chest and the back.

 

His troponin was 0.22 and he is going to be admitted with a diagnosis of 
unstable angina.

 

PAST MEDICAL HISTORY:

1.  History of colon adenocarcinoma, status post ileostomy, ileostomy reversal 
and colon cancer resection over the course of the years of  and .  The 
patient did not receive chemo or radiation at that point and that was his choice
, although it was offered for him at that point.  He is now in remission for 
the past 5 years.

2.  History of hypertension.

3.  History of asthma.

4.  History of gastroesophageal reflux disease.

5.  Diabetes type 2.

 

MEDICATIONS AT HOME:  Include:

1.  Metformin ER ______ mg daily.

2.  Fish oil 1 capsule b.i.d.

3.  Chlorhexidine mouthwash on a p.r.n. basis.

4.  MiraLax 17 g daily p.r.n.

5.  Amlodipine 10 mg daily.

6.  Albuterol inhaler on a p.r.n. basis.

7.  Accolate 20 mg b.i.d.

8.  Prevacid 15 mg daily.

 

ALLERGIES:  No known drug allergies.

 

FAMILY HISTORY:  Positive for father with history of carcinoma,  of MI in 
his 60s.

 

SOCIAL HISTORY:  The patient quit smoking in .  He denies any alcohol or 
drug use.  His wife  in the past year with colon cancer.  He used to be 
working in [employer] and as a  and he is currently retired.  As his 
surrogate, he names his son, Qasim Duran.

 

REVIEW OF SYSTEMS:  Please see history of present illness.  All the remaining 
12 systems were reviewed with the patient and were otherwise negative.

 

                               PHYSICAL EXAMINATION

 

GENERAL:  The patient is a very pleasant 79-year-old male, who is in no acute 
distress.  Alert, awake, and oriented x3.

 

VITAL SIGNS:  Blood pressure 143/73, heart rate of 71 and regular, respiratory 
rate 13, oxygen saturation 97% on 2 L of oxygen via nasal cannula, temperature 
of 98.0.

 

HEENT:  Head:  Atraumatic, normocephalic.  Eyes:  Pupils are equal, reactive to 
light and accommodation.  Oropharynx is clear.  Mucosa moist.

 

NECK:  Supple.  No JVD.  No bruits bilaterally.

 

RESPIRATORY:  Clear to auscultation bilaterally.

 

CARDIOVASCULAR:  Regular rate and rhythm.  No murmur.

 

ABDOMEN:  Soft, nontender.  Bowel sounds are present in all 4 quadrants.

 

EXTREMITIES:  There is no edema.  Pulses are +2 bilaterally.  No clubbing or 
cyanosis.

 

NEUROLOGIC:  Speech is clear.  Cranial nerves II through XII grossly intact.  
Motor strength is 5/5 bilaterally.

 

PSYCHIATRIC:  The patient is oriented x3 with no evidence of anxiety or 
depression.

 

DIAGNOSTIC STUDIES/LAB DATA:  Laboratory data showed a sodium of 139, potassium 
of 4.0, chloride 106, carbon dioxide 26, BUN 20, creatinine 1.04.  Liver 
function tests were unremarkable. Troponin of 0.22. CBC showed white blood cell 
count of 10.6, hemoglobin of 14.8, hematocrit of 44, and platelets of 183.

 

The patient's portable chest x-ray reviewed by myself prior to the official 
radiologist report, showed no evidence of mediastinal widening and grossly 
clear bilateral lung fields.  It is prior to the official radiologist report 
that is going to be obtained in the morning.  The patient's EKG was performed 
twice and showed normal sinus rhythm with a heart rate of 74 beats per minute 
with no ST changes.

 

ASSESSMENT AND PLAN:

1.  For unstable angina, the patient was already treated with Lovenox 1 mg/kg 
in the emergency department.  The patient is going to be placed on aspirin on a 
daily basis with low dose of beta-blocker and metoprolol tartrate 25 mg p.o. 
now.  I am going to place a nitroglycerin paste on the patient's skin.  So far, 
his discomfort had been minimal after the initial treatment in the emergency 
department.  His troponins are going to be continued to be obtained.  I will 
also obtain EKG in the morning and as well as transthoracic echocardiogram.  
The patient is going to be placed n.p.o. apart from sips of liquids for 
possible cardiac catheterization in the morning.  I will talk with the 
Cardiology Service in the morning.

2.  For the patient's history of dyslipidemia, fasting lipid profile is going 
to be obtained.

3.  History of asthma.  There is no exacerbation noted.  Albuterol is going to 
be continued on as needed basis.

4.  For history of gastroesophageal reflux disease, the patient is going to be 
placed on Pepcid intravenously while n.p.o.

5.  For DVT prophylaxis, the patient is going to be placed on Lovenox 
subcutaneously.

6.  The patient's code status is full and his surrogate is his son.

 

TIME SPENT:  Approximately 65 minutes was spent on admission of this patient, 
more than half that time was spent face-to-face with the patient during the 
interview and physical exam.

 

[id number]/[facility] #: [id number]

PRASHANT

## 2019-07-24 NOTE — PN
Subjective


Date of Service: 07/24/19


Interval History: 





Patient had cath through RT radial artery earlier this afternoon.  He denies 

chest pain, dyspnea. Has some discomfort RT wrist.





According to Dr. Langford, patient has multi-vessel disease, does not want CABG. 

He is willing to have stent to culprit lesion, where clot was visualized.  Will 

need to be done at higher level of care, Health system, [facility], etc.





Family History: Unchanged from Admission


Social History: Unchanged from Admission


Past Medical History: Unchanged from Admission





Objective


Active Medications: 








Acetaminophen (Tylenol Tab*)  650 mg PO Q4H PRN


   PRN Reason: FEVER/PAIN


   Last Admin: 07/24/19 02:38 Dose:  650 mg


Albuterol (Ventolin Hfa Inhaler*)  1 puff INH Q4H PRN


   PRN Reason: SHORTNESS OF BREATH


Aspirin (Aspirin Ec Tab*)  81 mg PO DAILY [facility], [facility]


Atorvastatin Calcium (Lipitor*)  80 mg PO 2100 [facility], [facility]


Dextrose (Dextrose 50% Vial 50 Ml*)  25 ml IV PUSH .FOR FS < 60 - SS PRN


   PRN Reason: FS < 60


Diphenhydramine HCl (Benadryl Iv*)  25 mg SLOW PUSH ONCE PRN


   PRN Reason: On call to Cath Lab


Diphenhydramine HCl (Benadryl Po*)  25 mg PO ONCE PRN


   PRN Reason: On call to Cath Lab


Famotidine (Pepcid Iv*)  20 mg IV SLOW PU BID [facility], 1928–1983


   Last Admin: 07/24/19 09:13 Dose:  20 mg


Sodium Chloride (Ns 0.9% 1000 Ml**)  1,000 mls @ 100 mls/hr IV .per rate [facility], 1928–1983


   Stop: 07/24/19 16:59


Insulin Human Lispro (Humalog*)  0 units SUBCUT Q6HR Former[facility], 1928–[facility]; Protocol


   Last Admin: 07/24/19 16:14 Dose:  Not Given


Metoprolol Tartrate (Lopressor Tab*)  12.5 mg PO BID [facility], [facility]


   Last Admin: 07/24/19 09:14 Dose:  12.5 mg


Morphine Sulfate (Morphine Inj (Syringe))*)  2 mg IV Q2H PRN


   PRN Reason: PAIN - MODERATE








 Vital Signs - 8 hr











  07/24/19 07/24/19 07/24/19





  13:09 13:13 13:25


 


Temperature   


 


Pulse Rate   56


 


Respiratory 17  14





Rate   


 


Blood Pressure   139/72





(mmHg)   


 


O2 Sat by Pulse  94 96





Oximetry   














  07/24/19 07/24/19 07/24/19





  13:40 13:55 14:00


 


Temperature   


 


Pulse Rate 57 60 56


 


Respiratory 16 21 20





Rate   


 


Blood Pressure 134/69 145/75 





(mmHg)   


 


O2 Sat by Pulse 94 96 95





Oximetry   














  07/24/19 07/24/19 07/24/19





  14:10 14:25 14:40


 


Temperature   


 


Pulse Rate 61 52 56


 


Respiratory 18  28





Rate   


 


Blood Pressure 145/91 147/75 149/68





(mmHg)   


 


O2 Sat by Pulse 98 96 95





Oximetry   














  07/24/19 07/24/19 07/24/19





  14:55 15:00 15:12


 


Temperature   


 


Pulse Rate 64 63 56


 


Respiratory 16 21 18





Rate   


 


Blood Pressure 129/88  138/69





(mmHg)   


 


O2 Sat by Pulse 97 97 98





Oximetry   














  07/24/19 07/24/19





  15:25 15:56


 


Temperature  37.1 C


 


Pulse Rate 56 59


 


Respiratory 16 16





Rate  


 


Blood Pressure 133/63 128/62





(mmHg)  


 


O2 Sat by Pulse 96 98





Oximetry  











Oxygen Devices in Use Now: None


Appearance: alert, no distress


Eyes: No Scleral Icterus


Ears/Nose/Mouth/Throat: NL Teeth, Lips, Gums


Neck: NL Appearance and Movements; NL JVP, Trachea Midline


Respiratory: Symmetrical Chest Expansion and Respiratory Effort, Clear to 

Auscultation


Cardiovascular: NL Sounds; No Murmurs; No JVD, RRR


Abdominal: NL Sounds; No Tenderness; No Distention


Lymphatic: No Cervical Adenopathy


Skin: No Rash or Ulcers


Neurological: Alert and Oriented x 3


Lines/Tubes/Other Access: Clean, Dry and Intact Peripheral IV


Nutrition: Taking PO's


Result Diagrams: 


 07/23/19 23:07





 07/23/19 23:07


Additional Lab and Data: 


 Laboratory Tests











  07/24/19 07/24/19 07/24/19





  05:35 05:36 10:02


 


APTT   


 


POC Glucose (mg/dL)  135 H  


 


Troponin I   2.52 H*  2.65 H*














  07/24/19 07/24/19 07/24/19





  10:02 13:48 15:58


 


APTT  44.7 H  


 


POC Glucose (mg/dL)   138 H  107 H


 


Troponin I   














EKG Data: 





CATH: 3-vessel disease, clot w/ ruptured plaque at bifurcation LAD and L circ, 

moderate disease in Circ, RCA


Echo: EF 50%, mild AS





Assess/Plan/Problems-Billing


Assessment: 


79 year old w/ non-STEMI, cath shows culprit lesion at location that requires 

higher level intervention








- Patient Problems


(1) Non-ST elevated myocardial infarction (non-STEMI)


Current Visit: Yes   Status: Acute   Priority: High   Code(s): I21.4 - NON-ST 

ELEVATION (NSTEMI) MYOCARDIAL INFARCTION   SNOMED Code(s): 99159750


   Comment: -Discussed w/ Dr. James


-Will need stent placed at [facility]


-Dr. Bola Childress accepted patient tomorrow morning for procedure


-Will be on Brillinta post-cath.


-Spoke with transfer center, will plan transfer tomorrow early AM, [phone].   





(2) Type 2 diabetes mellitus


Current Visit: Yes   Status: Acute   Priority: Medium   Comment: -Diabetes 

under good control


-Continue lispro sliding scale   





(3) Hypertension


Current Visit: Yes   Status: Acute   Priority: Medium   Code(s): I10 - 

ESSENTIAL (PRIMARY) HYPERTENSION   SNOMED Code(s): 22584989


   Comment: -BP in goal range, on metoprolol   





(4) DVT prophylaxis


Current Visit: Yes   Status: Acute   Priority: Low   Code(s): Z29.9 - ENCOUNTER 

FOR PROPHYLACTIC MEASURES, UNSPECIFIED   SNOMED Code(s): 718594758


   Comment: -SCDs

## 2019-07-25 VITALS — SYSTOLIC BLOOD PRESSURE: 131 MMHG | DIASTOLIC BLOOD PRESSURE: 60 MMHG

## 2019-07-25 LAB
ANION GAP SERPL CALC-SCNC: 7 MMOL/L (ref 2–11)
BUN SERPL-MCNC: 18 MG/DL (ref 6–24)
BUN/CREAT SERPL: 20 (ref 8–20)
CALCIUM SERPL-MCNC: 9 MG/DL (ref 8.6–10.3)
CHLORIDE SERPL-SCNC: 108 MMOL/L (ref 101–111)
GLUCOSE SERPL-MCNC: 150 MG/DL (ref 70–100)
HCO3 SERPL-SCNC: 23 MMOL/L (ref 22–32)
POTASSIUM SERPL-SCNC: 4 MMOL/L (ref 3.5–5)
SODIUM SERPL-SCNC: 138 MMOL/L (ref 135–145)

## 2019-07-25 RX ADMIN — INSULIN LISPRO SCH: 100 INJECTION, SOLUTION INTRAVENOUS; SUBCUTANEOUS at 05:57

## 2019-07-25 RX ADMIN — METOPROLOL TARTRATE SCH: 25 TABLET, FILM COATED ORAL at 05:58

## 2019-07-25 RX ADMIN — METOPROLOL TARTRATE SCH MG: 25 TABLET, FILM COATED ORAL at 00:39

## 2019-07-25 RX ADMIN — INSULIN LISPRO SCH: 100 INJECTION, SOLUTION INTRAVENOUS; SUBCUTANEOUS at 00:36

## 2019-07-25 RX ADMIN — FAMOTIDINE SCH MG: 10 INJECTION, SOLUTION INTRAVENOUS at 08:11

## 2019-07-25 NOTE — TRS
CC:  Dr. Rusty Calles; Dr. Marvin James; Dr. Bola Childress, [facility] *

 

TRANSFER SUMMARY:

 

DATE OF ADMISSION:  07/24/19

 

DATE OF TRANSFER:  07/25/19

 

ACCEPTING PHYSICIAN FOR THE TRANSFER:  Dr. Childress, [facility], 
[place].

 

PRIMARY DIAGNOSES:

1.  Non-ST-elevation myocardial infarction.

2.  Coronary artery disease in 3 vessels with a culprit lesion at the origin of 
the first diagonal and the left anterior descending.

 

SECONDARY DIAGNOSES:

1.  History of colon cancer, resected in 2014.

2.  Hypertension.

3.  Type 2 diabetes.

4.  Asthma.

5.  Gastroesophageal reflux disease.

 

MEDICATIONS AT TIME OF TRANSFER:

1.  Acetaminophen 650 mg p.o. q.4 hours as needed.

2.  Albuterol inhaler 1 puff q.4 hours as needed for wheezing.

3.  Aspirin 81 mg p.o. daily.

4.  Atorvastatin 80 mg p.o. q.p.m.

5.  Plavix 75 mg p.o. daily.

6.  D50 25 mL IV push for fingerstick less than 60.

7.  Famotidine 20 mg IV b.i.d.

8.  Humalog insulin sliding scale.

9.  Metoprolol 25 mg p.o. q.6 hours.

10.  Morphine 2 mg IV q.2 hours p.r.n.

 

HOSPITAL COURSE:  A 79-year-old patient with multiple risk factors for heart 
disease, presented with exertional chest pain that had been present for 4 years 
and had worsened over the past 8 months.  The chest pain became more prominent 
in the last 5 days and persisted for a longer period of time until the patient 
came to the [facility].  EKG demonstrated nonspecific T-wave 
flattening of lead II, III and aVF.  Initial troponin is 0.22 and nisa to 2.65 
on serial rechecks.  Other lab work during the hospital stay showed normal CBC, 
normal coagulation studies, normal creatinine.  Blood sugar between 107 and 
157.  LDL cholesterol of 113. Chest x-ray taken on admission showed no evidence 
for infiltrate or mass.  The patient was seen by cardiology consult and taken 
to the catheterization lab by Dr. James.  The findings of the cath were 
proximal LAD was large, the first diagonal had a 60% ostial stenosis, and the 
LAD at the diagonal origin had 90% stenosis with haziness consistent with 
thrombus.  There was moderate disease in the circumflex and right coronary 
artery as well.  Echocardiogram was also obtained on the day of admission which 
showed ejection fraction of 50%, hypokinesis in the apical and anteroseptal 
myocardium, mild aortic stenosis.  After the catheterization, it was discussed 
whether the patient should have a triple-vessel bypass versus intervention on 
the culprit lesion at the first diagonal.  Dr. James discussed the case with 
Dr. Childress in [place] and it was decided the patient can be transferred there 
to a hospital that has cardiothoracic surgery backup for this complex cardiac 
intervention.  The patient is agreeable to go to [place] for the next phase of 
his care.

 

DISPOSITION:  To [facility] in [place].

 

DIET:  Diabetic, low fat, low salt.

 

ACTIVITY:  Out of bed to chair.

 

STATUS:  Inpatient.

 

CONDITION:  Stable.

 

 [id number] #: [id number]

MTDD